# Patient Record
Sex: MALE | Race: BLACK OR AFRICAN AMERICAN | NOT HISPANIC OR LATINO | Employment: UNEMPLOYED | URBAN - METROPOLITAN AREA
[De-identification: names, ages, dates, MRNs, and addresses within clinical notes are randomized per-mention and may not be internally consistent; named-entity substitution may affect disease eponyms.]

---

## 2018-04-12 ENCOUNTER — HOSPITAL ENCOUNTER (EMERGENCY)
Facility: HOSPITAL | Age: 23
Discharge: HOME | End: 2018-04-12
Attending: EMERGENCY MEDICINE | Admitting: EMERGENCY MEDICINE

## 2018-04-12 VITALS
WEIGHT: 159 LBS | HEART RATE: 88 BPM | SYSTOLIC BLOOD PRESSURE: 91 MMHG | TEMPERATURE: 98.6 F | RESPIRATION RATE: 16 BRPM | OXYGEN SATURATION: 100 % | BODY MASS INDEX: 24.1 KG/M2 | HEIGHT: 68 IN | DIASTOLIC BLOOD PRESSURE: 55 MMHG

## 2018-04-12 DIAGNOSIS — R82.81 PYURIA: ICD-10-CM

## 2018-04-12 DIAGNOSIS — Z20.2 STD EXPOSURE: Primary | ICD-10-CM

## 2018-04-12 DIAGNOSIS — R36.9 PENILE DISCHARGE: ICD-10-CM

## 2018-04-12 LAB
BACTERIA UR QL AUTO: ABNORMAL /HPF
BILIRUB UR QL STRIP: NEGATIVE
CLARITY UR: CLEAR
COLOR UR: YELLOW
GLUCOSE UR STRIP-MCNC: NEGATIVE MG/DL
HGB UR QL STRIP.AUTO: NEGATIVE
HYALINE CASTS UR QL AUTO: ABNORMAL /LPF
KETONES UR QL STRIP: NEGATIVE
LEUKOCYTE ESTERASE UR QL STRIP.AUTO: ABNORMAL
NITRITE UR QL STRIP: NEGATIVE
PH UR STRIP.AUTO: 6 [PH] (ref 5–8)
PROT UR QL STRIP: NEGATIVE
RBC # UR: ABNORMAL /HPF
REF LAB TEST METHOD: ABNORMAL
SP GR UR STRIP: >=1.03 (ref 1–1.03)
SQUAMOUS #/AREA URNS HPF: ABNORMAL /HPF
UROBILINOGEN UR QL STRIP: ABNORMAL
WBC UR QL AUTO: ABNORMAL /HPF

## 2018-04-12 PROCEDURE — 99283 EMERGENCY DEPT VISIT LOW MDM: CPT

## 2018-04-12 PROCEDURE — 96372 THER/PROPH/DIAG INJ SC/IM: CPT

## 2018-04-12 PROCEDURE — 25010000002 CEFTRIAXONE PER 250 MG: Performed by: PHYSICIAN ASSISTANT

## 2018-04-12 PROCEDURE — 87591 N.GONORRHOEAE DNA AMP PROB: CPT | Performed by: PHYSICIAN ASSISTANT

## 2018-04-12 PROCEDURE — 87491 CHLMYD TRACH DNA AMP PROBE: CPT | Performed by: PHYSICIAN ASSISTANT

## 2018-04-12 PROCEDURE — 81001 URINALYSIS AUTO W/SCOPE: CPT | Performed by: PHYSICIAN ASSISTANT

## 2018-04-12 RX ORDER — CEFDINIR 300 MG/1
300 CAPSULE ORAL 2 TIMES DAILY
Qty: 14 CAPSULE | Refills: 0 | Status: SHIPPED | OUTPATIENT
Start: 2018-04-12

## 2018-04-12 RX ORDER — CEFTRIAXONE SODIUM 250 MG/1
250 INJECTION, POWDER, FOR SOLUTION INTRAMUSCULAR; INTRAVENOUS ONCE
Status: COMPLETED | OUTPATIENT
Start: 2018-04-12 | End: 2018-04-12

## 2018-04-12 RX ORDER — LIDOCAINE HYDROCHLORIDE 10 MG/ML
0.9 INJECTION, SOLUTION EPIDURAL; INFILTRATION; INTRACAUDAL; PERINEURAL ONCE
Status: COMPLETED | OUTPATIENT
Start: 2018-04-12 | End: 2018-04-12

## 2018-04-12 RX ORDER — DOXYCYCLINE 100 MG/1
100 CAPSULE ORAL ONCE
Status: COMPLETED | OUTPATIENT
Start: 2018-04-12 | End: 2018-04-12

## 2018-04-12 RX ORDER — DOXYCYCLINE HYCLATE 100 MG/1
100 TABLET, DELAYED RELEASE ORAL 2 TIMES DAILY
Qty: 20 TABLET | Refills: 0 | Status: SHIPPED | OUTPATIENT
Start: 2018-04-12 | End: 2018-04-12

## 2018-04-12 RX ORDER — AZITHROMYCIN 250 MG/1
1000 TABLET, FILM COATED ORAL ONCE
Status: COMPLETED | OUTPATIENT
Start: 2018-04-12 | End: 2018-04-12

## 2018-04-12 RX ADMIN — AZITHROMYCIN 1000 MG: 250 TABLET, FILM COATED ORAL at 04:35

## 2018-04-12 RX ADMIN — LIDOCAINE HYDROCHLORIDE 0.9 ML: 10 INJECTION, SOLUTION EPIDURAL; INFILTRATION; INTRACAUDAL; PERINEURAL at 03:10

## 2018-04-12 RX ADMIN — CEFTRIAXONE SODIUM 250 MG: 250 INJECTION, POWDER, FOR SOLUTION INTRAMUSCULAR; INTRAVENOUS at 03:10

## 2018-04-12 RX ADMIN — DOXYCYCLINE 100 MG: 100 CAPSULE ORAL at 03:09

## 2018-04-12 NOTE — DISCHARGE INSTRUCTIONS
Patient has positive STD exposure.  We treated him with Rocephin IM and Zithromax and will prescribe Omnicef 300 mg twice daily for 7 days.  Recommend patient to establish care with primary care provider for follow-up.  I will give him a list of accepting physicians in the local area.  His partner was treated as well.  Return if worsening symptoms.    Follow up with one of the [facility] physician groups below to setup primary care. If you have trouble following up, please call Sandra Mccoy, our transitional care nurse, at [phone].    (Dr. Bhakta, Dr. Strange, Dr. Campos, and Dr. Hernandez.)  [facility], Primary Care, [phone], [address] [facility] [address], [address], Primary Care, [phone], [address] [facility] [address] [place], [address] [facility], Primary Care, [phone], [address] [facility], [address] [place], [address] [facility], Primary Care, [phone], [address] [facility], [address] [place], [address]     [place] [address] [facility], Primary Care, [phone], [address] [facility], [address] [place], [address]    [place] [address] [facility], Primary Care, [phone], [address]    [place], [address] [facility], Primary Care, [phone], [address] [facility], [address] [place], [address] [facility], Primary Care, [phone], [address] [facility], [address] [place], [address] [facility]) [facility], Primary Care, [phone], [address] [facility], [address] [place], [address] [facility], Primary Care, [phone], [address] [facility], [address] [place], [address] [facility],  Primary Care, [phone], [address], [address], [place], [address] [place] [address], Primary Care, [phone], [address] [facility], [facility] [address] [place] [address]

## 2018-04-12 NOTE — ED PROVIDER NOTES
Subjective   This is a 22-year-old -American male that presents to the ER with 2-3 week history of penile discharge.  Patient says that he has intermittent white discharge coming from his penis.  His girlfriend was recently diagnosed with chlamydia at [facility].  Both of them have not been treated for this.  Patient denies any abdominal pain.  He denies any dysuria, frequency, or urgency.  He denies any flank or back pain.  He denies any nausea or vomiting.  He denies any fever or chills.  He denies any bowel changes.  He denies any previous history of sexually transmitted disease.  He denies any genital lesions.  He denies any redness or swelling to his penis or scrotum.  Patient has no significant past medical history.  He has no known drug allergies.        History provided by:  Patient  Male  Problem   Presenting symptoms: penile discharge (intermittent white discharge with STD exposure)    Context comment:  Patient reports intermittent white penile discharge and his girlfriend was recently diagnosed with Chlamydia at [facility].  Relieved by:  Nothing  Worsened by:  Nothing  Ineffective treatments:  None tried  Associated symptoms: no abdominal pain, no fever, no flank pain, no genital itching, no genital lesions, no genital rash, no groin pain, no hematuria, no nausea, no penile redness, no penile swelling, no scrotal swelling, no urinary frequency, no urinary hesitation, no urinary incontinence and no vomiting    Risk factors: recent sexual activity and STI exposure        Review of Systems   Constitutional: Negative for appetite change, chills, fatigue and fever.   Gastrointestinal: Negative for abdominal pain, nausea and vomiting.   Genitourinary: Positive for discharge (intermittent white discharge with STD exposure). Negative for bladder incontinence, flank pain, frequency, hematuria, hesitancy, penile swelling and scrotal swelling.   Skin: Negative.        History reviewed. No pertinent  past medical history.    No Known Allergies    History reviewed. No pertinent surgical history.    History reviewed. No pertinent family history.    Social History     Social History   • Marital status: Single     Social History Main Topics   • Smoking status: Current Every Day Smoker     Types: Cigarettes   • Alcohol use No   • Drug use: No   • Sexual activity: Yes     Other Topics Concern   • Not on file           Objective   Physical Exam   Constitutional: He is oriented to person, place, and time. He appears well-developed and well-nourished. No distress.   HENT:   Head: Normocephalic and atraumatic.   Eyes: Conjunctivae and EOM are normal. Pupils are equal, round, and reactive to light. No scleral icterus.   Neck: Normal range of motion. Neck supple.   Cardiovascular: Normal rate, regular rhythm and normal heart sounds.    Pulmonary/Chest: Effort normal and breath sounds normal. No respiratory distress.   Abdominal: Soft. Bowel sounds are normal. He exhibits no distension. There is no tenderness.   Musculoskeletal: Normal range of motion. He exhibits no edema.   Lymphadenopathy:     He has no cervical adenopathy.   Neurological: He is alert and oriented to person, place, and time.   Skin: Skin is warm and dry. He is not diaphoretic.   Psychiatric: He has a normal mood and affect. His behavior is normal.   Nursing note and vitals reviewed.      Procedures         ED Course  ED Course   Comment By Time   UA shows TNTC WBC.  This is likely from STD.  With STD exposure, I will tx with Rocephin and Zithromax 1 gram and Omnicef on discharge.  Discussed with Dr. Garcia. Supriya Peacock PA-C 04/12 7977   Patient refused penile swab.  He says that he is not having any active penile discharge and refuses for me to perform the swab on exam.  I will order a Chlamydia/gonorrhea PCR on urinalysis.  I will also treat him for these sexually transmitted diseases due to positive exposure. Supriya Peacock PA-C 04/12 0803     "    Recent Results (from the past 24 hour(s))   Urinalysis With / Microscopic If Indicated - Urine, Clean Catch    Collection Time: 04/12/18  2:54 AM   Result Value Ref Range    Color, UA Yellow Yellow, Straw    Appearance, UA Clear Clear    pH, UA 6.0 5.0 - 8.0    Specific Gravity, UA >=1.030 1.001 - 1.030    Glucose, UA Negative Negative    Ketones, UA Negative Negative    Bilirubin, UA Negative Negative    Blood, UA Negative Negative    Protein, UA Negative Negative    Leuk Esterase, UA Small (1+) (A) Negative    Nitrite, UA Negative Negative    Urobilinogen, UA 1.0 E.U./dL 0.2 - 1.0 E.U./dL   Urinalysis, Microscopic Only - Urine, Clean Catch    Collection Time: 04/12/18  2:54 AM   Result Value Ref Range    RBC, UA 0-2 None Seen, 0-2 /HPF    WBC, UA Too Numerous to Count (A) None Seen, 0-2 /HPF    Bacteria, UA None Seen None Seen, Trace /HPF    Squamous Epithelial Cells, UA 3-6 (A) None Seen, 0-2 /HPF    Hyaline Casts, UA Too Numerous to Count 0 - 6 /LPF    Methodology Manual Light Microscopy      Note: In addition to lab results from this visit, the labs listed above may include labs taken at another facility or during a different encounter within the last 24 hours. Please correlate lab times with ED admission and discharge times for further clarification of the services performed during this visit.    No orders to display     Vitals:    04/12/18 0203   BP: 91/55   BP Location: Left arm   Patient Position: Sitting   Pulse: 88   Resp: 16   Temp: 98.6 °F (37 °C)   TempSrc: Oral   SpO2: 100%   Weight: 72.1 kg (159 lb)   Height: 172.7 cm (68\")     Medications   cefTRIAXone (ROCEPHIN) injection 250 mg (250 mg Intramuscular Given 4/12/18 0310)   lidocaine PF 1% (XYLOCAINE) injection 0.9 mL (0.9 mL Injection Given 4/12/18 0310)   doxycycline (MONODOX) capsule 100 mg (100 mg Oral Given 4/12/18 0309)   azithromycin (ZITHROMAX) tablet 1,000 mg (1,000 mg Oral Given 4/12/18 7185)     ECG/EMG Results (last 24 hours)     ** " No results found for the last 24 hours. **                  [facility]    Final diagnoses:   STD exposure   Penile discharge   Pyuria            Supriya Peacock PA-C  04/12/18 0453       Supriya Peacock PA-C  04/12/18 0453

## 2018-04-16 ENCOUNTER — TELEPHONE (OUTPATIENT)
Dept: EMERGENCY DEPT | Facility: HOSPITAL | Age: 23
End: 2018-04-16

## 2018-04-16 LAB
C TRACH RRNA SPEC DONR QL NAA+PROBE: POSITIVE
N GONORRHOEA DNA SPEC QL NAA+PROBE: NEGATIVE